# Patient Record
Sex: MALE | Race: WHITE | NOT HISPANIC OR LATINO | Employment: OTHER | URBAN - NONMETROPOLITAN AREA
[De-identification: names, ages, dates, MRNs, and addresses within clinical notes are randomized per-mention and may not be internally consistent; named-entity substitution may affect disease eponyms.]

---

## 2023-12-27 PROBLEM — I21.4 NSTEMI (NON-ST ELEVATED MYOCARDIAL INFARCTION) (MULTI): Status: ACTIVE | Noted: 2023-12-27

## 2023-12-27 PROBLEM — I25.2 HISTORY OF MI (MYOCARDIAL INFARCTION): Status: ACTIVE | Noted: 2023-12-27

## 2023-12-27 PROBLEM — E78.5 HYPERLIPIDEMIA: Status: ACTIVE | Noted: 2023-12-27

## 2023-12-27 PROBLEM — Z98.61 S/P PTCA (PERCUTANEOUS TRANSLUMINAL CORONARY ANGIOPLASTY): Status: ACTIVE | Noted: 2023-12-27

## 2023-12-27 PROBLEM — I25.10 ATHEROSCLEROSIS OF NATIVE CORONARY ARTERY WITHOUT ANGINA PECTORIS: Status: ACTIVE | Noted: 2023-12-27

## 2023-12-27 RX ORDER — HYDROCODONE BITARTRATE AND IBUPROFEN 7.5; 2 MG/1; MG/1
1 TABLET, FILM COATED ORAL EVERY 4 HOURS PRN
COMMUNITY

## 2023-12-27 RX ORDER — PREGABALIN 50 MG/1
50 CAPSULE ORAL 2 TIMES DAILY
COMMUNITY
End: 2024-01-05 | Stop reason: ALTCHOICE

## 2023-12-27 RX ORDER — ASPIRIN 81 MG/1
81 TABLET ORAL DAILY
COMMUNITY

## 2023-12-27 RX ORDER — CARVEDILOL 12.5 MG/1
12.5 TABLET ORAL
COMMUNITY

## 2023-12-27 RX ORDER — EPINEPHRINE 0.22MG
2 AEROSOL WITH ADAPTER (ML) INHALATION DAILY
COMMUNITY

## 2023-12-27 RX ORDER — ATORVASTATIN CALCIUM 40 MG/1
40 TABLET, FILM COATED ORAL EVERY OTHER DAY
COMMUNITY

## 2023-12-27 RX ORDER — FERROUS SULFATE 325(65) MG
325 TABLET ORAL
COMMUNITY

## 2023-12-27 RX ORDER — LOSARTAN POTASSIUM 100 MG/1
100 TABLET ORAL DAILY
COMMUNITY

## 2023-12-27 RX ORDER — NITROGLYCERIN 0.4 MG/1
0.4 TABLET SUBLINGUAL EVERY 5 MIN PRN
COMMUNITY

## 2024-01-05 ENCOUNTER — OFFICE VISIT (OUTPATIENT)
Dept: CARDIOLOGY | Facility: CLINIC | Age: 74
End: 2024-01-05
Payer: MEDICARE

## 2024-01-05 VITALS
SYSTOLIC BLOOD PRESSURE: 136 MMHG | WEIGHT: 177 LBS | BODY MASS INDEX: 26.83 KG/M2 | HEIGHT: 68 IN | DIASTOLIC BLOOD PRESSURE: 70 MMHG | HEART RATE: 52 BPM

## 2024-01-05 DIAGNOSIS — E78.2 MIXED HYPERLIPIDEMIA: ICD-10-CM

## 2024-01-05 DIAGNOSIS — E66.3 OVERWEIGHT (BMI 25.0-29.9): ICD-10-CM

## 2024-01-05 DIAGNOSIS — I25.10 ATHEROSCLEROSIS OF NATIVE CORONARY ARTERY OF NATIVE HEART WITHOUT ANGINA PECTORIS: ICD-10-CM

## 2024-01-05 DIAGNOSIS — I21.4 NSTEMI (NON-ST ELEVATED MYOCARDIAL INFARCTION) (MULTI): ICD-10-CM

## 2024-01-05 DIAGNOSIS — Z98.61 S/P PTCA (PERCUTANEOUS TRANSLUMINAL CORONARY ANGIOPLASTY): ICD-10-CM

## 2024-01-05 PROBLEM — I25.2 HISTORY OF MI (MYOCARDIAL INFARCTION): Status: RESOLVED | Noted: 2023-12-27 | Resolved: 2024-01-05

## 2024-01-05 PROCEDURE — 1159F MED LIST DOCD IN RCRD: CPT | Performed by: INTERNAL MEDICINE

## 2024-01-05 PROCEDURE — 1160F RVW MEDS BY RX/DR IN RCRD: CPT | Performed by: INTERNAL MEDICINE

## 2024-01-05 PROCEDURE — 99213 OFFICE O/P EST LOW 20 MIN: CPT | Performed by: INTERNAL MEDICINE

## 2024-01-05 NOTE — LETTER
"January 5, 2024     Derrick Lozano,   [address] [place] [address]    Patient: Phillip Nguyễn   YOB: 1950   Date of Visit: 1/5/2024       Dear Dr. Derrick Lozano, DO:    Thank you for referring Phillip Nguyễn to me for evaluation. Below are my notes for this consultation.  If you have questions, please do not hesitate to call me. I look forward to following your patient along with you.       Sincerely,     Reji Lozoya,       CC: No Recipients  ______________________________________________________________________________________    Subjective   Phillip Nguyễn is a 73 y.o. male       Chief Complaint    6 Month Post-Transplant Eval          73-year-old gentleman here for 6-month follow-up he is doing well he denies any cardiovascular events, complaints, nitrate usage, hospitalizations.  His NYHA classification is class I    He has a history of non-ST elevation MI in June 2023 with primary revascularization of the distal circumflex and distal RCA with Dr. Alban Trevizo.  He has done well over the past 7 months and remains compliant on current DAPT and GDMT    Recommendations, follow-up in 6 months at which point we will consider discontinuing his ticagrelor, obtain results from laboratories next month.         Review of Systems   All other systems reviewed and are negative.         Visit Vitals  /70 (BP Location: Left arm, Patient Position: Sitting)   Pulse 52   Ht 1.727 m (5' 8\")   Wt 80.3 kg (177 lb)   BMI 26.91 kg/m²   Smoking Status Former   BSA 1.96 m²        Objective   Physical Exam  Constitutional:       Appearance: Normal appearance. He is normal weight.   HENT:      Nose: Nose normal.   Neck:      Vascular: No carotid bruit.   Cardiovascular:      Rate and Rhythm: Normal rate.      Pulses: Normal pulses.      Heart sounds: Normal heart sounds.   Pulmonary:      Effort: Pulmonary effort is normal.   Abdominal:      General: Bowel sounds are normal.      " Palpations: Abdomen is soft.   Genitourinary:     Rectum: Normal.   Musculoskeletal:         General: Normal range of motion.      Cervical back: Normal range of motion.      Right lower leg: No edema.      Left lower leg: No edema.   Skin:     General: Skin is warm and dry.   Neurological:      General: No focal deficit present.      Mental Status: He is alert.   Psychiatric:         Mood and Affect: Mood normal.         Behavior: Behavior normal.         Thought Content: Thought content normal.         Judgment: Judgment normal.         Current Medications    Current Outpatient Medications:   •  aspirin 81 mg EC tablet, Take 1 tablet (81 mg) by mouth once daily., Disp: , Rfl:   •  atorvastatin (Lipitor) 40 mg tablet, Take 1 tablet (40 mg) by mouth every other day., Disp: , Rfl:   •  carvedilol (Coreg) 12.5 mg tablet, Take 1 tablet (12.5 mg) by mouth 2 times a day with meals., Disp: , Rfl:   •  coenzyme Q-10 100 mg capsule, Take 2 capsules (200 mg) by mouth once daily., Disp: , Rfl:   •  ferrous sulfate, 325 mg ferrous sulfate, tablet, Take 1 tablet by mouth. Take one tablet by mouth every Monday and Wednesday, Disp: , Rfl:   •  HYDROcodone-ibuprofen (Vicoprofen) 7.5-200 mg tablet, Take 1 tablet by mouth every 4 hours if needed for severe pain (7 - 10)., Disp: , Rfl:   •  losartan (Cozaar) 100 mg tablet, Take 1 tablet (100 mg) by mouth once daily., Disp: , Rfl:   •  nitroglycerin (Nitrostat) 0.4 mg SL tablet, Place 1 tablet (0.4 mg) under the tongue every 5 minutes if needed for chest pain., Disp: , Rfl:   •  ticagrelor (Brilinta) 90 mg tablet, Take 1 tablet (90 mg) by mouth 2 times a day., Disp: , Rfl:            Scribe Attestation  By signing my name below, April ERNANDEZ LPN  , Scribe   attest that this documentation has been prepared under the direction and in the presence of Reji Lozoya DO.           Assessment/Plan   1. Atherosclerosis of native coronary artery of native heart without angina pectoris         2. NSTEMI (non-ST elevated myocardial infarction) (CMS/HCC)        3. S/P PTCA (percutaneous transluminal coronary angioplasty)        4. Mixed hyperlipidemia        5. Overweight (BMI 25.0-29.9)

## 2024-01-05 NOTE — PROGRESS NOTES
"Subjective   Phillip Nguyễn is a 73 y.o. male       Chief Complaint    6 Month Post-Transplant Eval          73-year-old gentleman here for 6-month follow-up he is doing well he denies any cardiovascular events, complaints, nitrate usage, hospitalizations.  His NYHA classification is class I    He has a history of non-ST elevation MI in June 2023 with primary revascularization of the distal circumflex and distal RCA with Dr. Alban Trevizo.  He has done well over the past 7 months and remains compliant on current DAPT and GDMT    Recommendations, follow-up in 6 months at which point we will consider discontinuing his ticagrelor, obtain results from laboratories next month.         Review of Systems   All other systems reviewed and are negative.         Visit Vitals  /70 (BP Location: Left arm, Patient Position: Sitting)   Pulse 52   Ht 1.727 m (5' 8\")   Wt 80.3 kg (177 lb)   BMI 26.91 kg/m²   Smoking Status Former   BSA 1.96 m²        Objective   Physical Exam  Constitutional:       Appearance: Normal appearance. He is normal weight.   HENT:      Nose: Nose normal.   Neck:      Vascular: No carotid bruit.   Cardiovascular:      Rate and Rhythm: Normal rate.      Pulses: Normal pulses.      Heart sounds: Normal heart sounds.   Pulmonary:      Effort: Pulmonary effort is normal.   Abdominal:      General: Bowel sounds are normal.      Palpations: Abdomen is soft.   Genitourinary:     Rectum: Normal.   Musculoskeletal:         General: Normal range of motion.      Cervical back: Normal range of motion.      Right lower leg: No edema.      Left lower leg: No edema.   Skin:     General: Skin is warm and dry.   Neurological:      General: No focal deficit present.      Mental Status: He is alert.   Psychiatric:         Mood and Affect: Mood normal.         Behavior: Behavior normal.         Thought Content: Thought content normal.         Judgment: Judgment normal.         Current Medications    Current Outpatient " Medications:     aspirin 81 mg EC tablet, Take 1 tablet (81 mg) by mouth once daily., Disp: , Rfl:     atorvastatin (Lipitor) 40 mg tablet, Take 1 tablet (40 mg) by mouth every other day., Disp: , Rfl:     carvedilol (Coreg) 12.5 mg tablet, Take 1 tablet (12.5 mg) by mouth 2 times a day with meals., Disp: , Rfl:     coenzyme Q-10 100 mg capsule, Take 2 capsules (200 mg) by mouth once daily., Disp: , Rfl:     ferrous sulfate, 325 mg ferrous sulfate, tablet, Take 1 tablet by mouth. Take one tablet by mouth every Monday and Wednesday, Disp: , Rfl:     HYDROcodone-ibuprofen (Vicoprofen) 7.5-200 mg tablet, Take 1 tablet by mouth every 4 hours if needed for severe pain (7 - 10)., Disp: , Rfl:     losartan (Cozaar) 100 mg tablet, Take 1 tablet (100 mg) by mouth once daily., Disp: , Rfl:     nitroglycerin (Nitrostat) 0.4 mg SL tablet, Place 1 tablet (0.4 mg) under the tongue every 5 minutes if needed for chest pain., Disp: , Rfl:     ticagrelor (Brilinta) 90 mg tablet, Take 1 tablet (90 mg) by mouth 2 times a day., Disp: , Rfl:            Scribe Attestation  By signing my name below, OMA Aprilenrique EASTON LPN  , Scribe   attest that this documentation has been prepared under the direction and in the presence of Reji Lozoya DO.           Assessment/Plan   1. Atherosclerosis of native coronary artery of native heart without angina pectoris        2. NSTEMI (non-ST elevated myocardial infarction) (CMS/[facility])        3. S/P PTCA (percutaneous transluminal coronary angioplasty)        4. Mixed hyperlipidemia        5. Overweight (BMI 25.0-29.9)

## 2024-05-15 DIAGNOSIS — I25.10 ATHEROSCLEROSIS OF NATIVE CORONARY ARTERY WITHOUT ANGINA PECTORIS, UNSPECIFIED WHETHER NATIVE OR TRANSPLANTED HEART: ICD-10-CM

## 2024-05-15 NOTE — TELEPHONE ENCOUNTER
Patient phoned states he can no longer afford Brilinta, doesn't qualify for patient assistance. Inquiring if there is an alternative you would recommend, or if he could discontinue use. Please advise.    To Dr. Reji Liang MD for review

## 2024-05-21 RX ORDER — CLOPIDOGREL BISULFATE 75 MG/1
75 TABLET ORAL DAILY
Qty: 90 TABLET | Refills: 3 | Status: SHIPPED | OUTPATIENT
Start: 2024-05-21 | End: 2025-05-21

## 2024-05-29 RX ORDER — TICAGRELOR 90 MG/1
90 TABLET ORAL 2 TIMES DAILY
Qty: 180 TABLET | Refills: 3 | OUTPATIENT
Start: 2024-05-29

## 2024-06-18 DIAGNOSIS — E78.5 HYPERLIPIDEMIA, UNSPECIFIED HYPERLIPIDEMIA TYPE: ICD-10-CM

## 2024-06-20 RX ORDER — ATORVASTATIN CALCIUM 40 MG/1
40 TABLET, FILM COATED ORAL
Qty: 45 TABLET | Refills: 3 | OUTPATIENT
Start: 2024-06-20

## 2024-06-24 DIAGNOSIS — E78.2 MIXED HYPERLIPIDEMIA: ICD-10-CM

## 2024-06-27 RX ORDER — ATORVASTATIN CALCIUM 40 MG/1
40 TABLET, FILM COATED ORAL EVERY OTHER DAY
Qty: 45 TABLET | Refills: 3 | Status: SHIPPED | OUTPATIENT
Start: 2024-06-27 | End: 2025-06-27

## 2024-07-09 ENCOUNTER — APPOINTMENT (OUTPATIENT)
Dept: CARDIOLOGY | Facility: CLINIC | Age: 74
End: 2024-07-09
Payer: MEDICARE

## 2024-07-09 VITALS
BODY MASS INDEX: 26.07 KG/M2 | WEIGHT: 176 LBS | DIASTOLIC BLOOD PRESSURE: 80 MMHG | SYSTOLIC BLOOD PRESSURE: 138 MMHG | HEIGHT: 69 IN | HEART RATE: 52 BPM

## 2024-07-09 DIAGNOSIS — Z98.61 S/P PTCA (PERCUTANEOUS TRANSLUMINAL CORONARY ANGIOPLASTY): ICD-10-CM

## 2024-07-09 DIAGNOSIS — Z87.891 FORMER SMOKER: ICD-10-CM

## 2024-07-09 DIAGNOSIS — I10 ESSENTIAL HYPERTENSION: ICD-10-CM

## 2024-07-09 DIAGNOSIS — E66.3 OVERWEIGHT (BMI 25.0-29.9): ICD-10-CM

## 2024-07-09 DIAGNOSIS — I25.10 ATHEROSCLEROSIS OF NATIVE CORONARY ARTERY WITHOUT ANGINA PECTORIS, UNSPECIFIED WHETHER NATIVE OR TRANSPLANTED HEART: ICD-10-CM

## 2024-07-09 DIAGNOSIS — I25.10 ATHEROSCLEROSIS OF NATIVE CORONARY ARTERY OF NATIVE HEART WITHOUT ANGINA PECTORIS: ICD-10-CM

## 2024-07-09 DIAGNOSIS — I21.4 NSTEMI (NON-ST ELEVATED MYOCARDIAL INFARCTION) (MULTI): ICD-10-CM

## 2024-07-09 DIAGNOSIS — E78.2 MIXED HYPERLIPIDEMIA: ICD-10-CM

## 2024-07-09 PROCEDURE — 3079F DIAST BP 80-89 MM HG: CPT | Performed by: INTERNAL MEDICINE

## 2024-07-09 PROCEDURE — 1036F TOBACCO NON-USER: CPT | Performed by: INTERNAL MEDICINE

## 2024-07-09 PROCEDURE — 3075F SYST BP GE 130 - 139MM HG: CPT | Performed by: INTERNAL MEDICINE

## 2024-07-09 PROCEDURE — 1159F MED LIST DOCD IN RCRD: CPT | Performed by: INTERNAL MEDICINE

## 2024-07-09 PROCEDURE — 99214 OFFICE O/P EST MOD 30 MIN: CPT | Performed by: INTERNAL MEDICINE

## 2024-07-09 PROCEDURE — 1160F RVW MEDS BY RX/DR IN RCRD: CPT | Performed by: INTERNAL MEDICINE

## 2024-07-09 RX ORDER — CARVEDILOL 6.25 MG/1
6.25 TABLET ORAL
Qty: 180 TABLET | Refills: 3 | Status: SHIPPED | OUTPATIENT
Start: 2024-07-09 | End: 2025-07-09

## 2024-07-09 RX ORDER — CLOPIDOGREL BISULFATE 75 MG/1
75 TABLET ORAL DAILY
Qty: 90 TABLET | Refills: 1 | Status: SHIPPED | OUTPATIENT
Start: 2024-07-09 | End: 2025-07-09

## 2024-07-09 NOTE — PATIENT INSTRUCTIONS
Please bring all medicines, vitamins, and herbal supplements with you when you come to the office.    Prescriptions will not be filled unless you are compliant with your follow up appointments or have a follow up appointment scheduled as per instruction of your physician. Refills should be requested at the time of your visit.     Continue plavix for additional 6 months. May discontinue in February 2025    BMI was above normal measurement. Current weight: 79.8 kg (176 lb)  Weight change since last visit (-) denotes wt loss -1 lbs   Weight loss needed to achieve BMI 25: 9.5 Lbs  Weight loss needed to achieve BMI 30: -23.8 Lbs  Provided instructions on dietary changes.

## 2024-07-09 NOTE — LETTER
"July 9, 2024     Derrick Lozano,   [address] [place] [address]    Patient: phillip Nguyễn   YOB: 1950   Date of Visit: 7/9/2024       Dear Dr. Derrick Lozano, DO:    Thank you for referring phillip Nguyễn to me for evaluation. Below are my notes for this consultation.  If you have questions, please do not hesitate to call me. I look forward to following your patient along with you.       Sincerely,     Reji Lozoya,       CC: No Recipients  ______________________________________________________________________________________    Subjective   Phillip Nguyễn is a 74 y.o. male       Chief Complaint    Follow-up          74-year-old gentleman returns for follow-up he is doing well he has no cardiovascular events, complaints, nitrate usage or hospitalizations.  He does complain of chronic fatigue likely secondary to beta-blocker side effect.    He did not bring any of his medications and therefore cannot ascribe to his current meds or dosings.    She supposedly remains on DAPT with clopidogrel and aspirin, and carvedilol at 12.5 mg twice daily in addition to losartan and atorvastatin.    He is a former smoker, has essential hypertension hyperlipidemia, non-ST elevation MI in June 2023 with revascularization of the distal circumflex and distal right with Dr. Alban Trevizo at that time.  He has done well since that time.    Recommendations: Continue current therapies other than we will titrate his carvedilol down to 6.25 twice daily, take it with meals; continue with clopidogrel for another 6 months for total of 18 months from his revascularization procedures and then discontinue in February, will follow-up again in 1 year         Review of Systems   All other systems reviewed and are negative.           Vitals:    07/09/24 0943   BP: 138/80   BP Location: Left arm   Patient Position: Sitting   Pulse: 52   Weight: 79.8 kg (176 lb)   Height: 1.74 m (5' 8.5\")        Objective "   Physical Exam  Constitutional:       Appearance: Normal appearance.   HENT:      Nose: Nose normal.   Neck:      Vascular: No carotid bruit.   Cardiovascular:      Rate and Rhythm: Normal rate.      Pulses: Normal pulses.      Heart sounds: Normal heart sounds.   Pulmonary:      Effort: Pulmonary effort is normal.   Abdominal:      General: Bowel sounds are normal.      Palpations: Abdomen is soft.   Musculoskeletal:         General: Normal range of motion.      Cervical back: Normal range of motion.      Right lower leg: No edema.      Left lower leg: No edema.   Skin:     General: Skin is warm and dry.   Neurological:      General: No focal deficit present.      Mental Status: He is alert.   Psychiatric:         Mood and Affect: Mood normal.         Behavior: Behavior normal.         Thought Content: Thought content normal.         Judgment: Judgment normal.         Allergies  Patient has no known allergies.     Current Medications    Current Outpatient Medications:   •  aspirin 81 mg EC tablet, Take 1 tablet (81 mg) by mouth once daily., Disp: , Rfl:   •  atorvastatin (Lipitor) 40 mg tablet, Take 1 tablet (40 mg) by mouth every other day., Disp: 45 tablet, Rfl: 3  •  carvedilol (Coreg) 12.5 mg tablet, Take 1 tablet (12.5 mg) by mouth 2 times daily (morning and late afternoon)., Disp: , Rfl:   •  clopidogrel (Plavix) 75 mg tablet, Take 1 tablet (75 mg) by mouth once daily., Disp: 90 tablet, Rfl: 3  •  coenzyme Q-10 100 mg capsule, Take 2 capsules (200 mg) by mouth once daily., Disp: , Rfl:   •  ferrous sulfate, 325 mg ferrous sulfate, tablet, Take 1 tablet by mouth. Take one tablet by mouth every Monday and Wednesday, Disp: , Rfl:   •  HYDROcodone-ibuprofen (Vicoprofen) 7.5-200 mg tablet, Take 1 tablet by mouth every 4 hours if needed for severe pain (7 - 10)., Disp: , Rfl:   •  losartan (Cozaar) 100 mg tablet, Take 1 tablet (100 mg) by mouth once daily., Disp: , Rfl:   •  nitroglycerin (Nitrostat) 0.4 mg SL  tablet, Place 1 tablet (0.4 mg) under the tongue every 5 minutes if needed for chest pain., Disp: , Rfl:                      Assessment/Plan   1. Atherosclerosis of native coronary artery of native heart without angina pectoris  Follow Up In Cardiology      2. S/P PTCA (percutaneous transluminal coronary angioplasty)        3. NSTEMI (non-ST elevated myocardial infarction) (Multi)        4. Essential hypertension        5. Mixed hyperlipidemia        6. Overweight (BMI 25.0-29.9)        7. Former smoker                 Scribe Attestation  By signing my name below, I, April EASTON LPN  , Scribe   attest that this documentation has been prepared under the direction and in the presence of Reji Lozoya DO.     Provider Attestation - Scribe documentation    All medical record entries made by the Scribe were at my direction and personally dictated by me. I have reviewed the chart and agree that the record accurately reflects my personal performance of the history, physical exam, discussion and plan.

## 2024-07-09 NOTE — PROGRESS NOTES
"Subjective   Phillip Nguyễn is a 74 y.o. male       Chief Complaint    Follow-up          74-year-old gentleman returns for follow-up he is doing well he has no cardiovascular events, complaints, nitrate usage or hospitalizations.  He does complain of chronic fatigue likely secondary to beta-blocker side effect.    He did not bring any of his medications and therefore cannot ascribe to his current meds or dosings.    She supposedly remains on DAPT with clopidogrel and aspirin, and carvedilol at 12.5 mg twice daily in addition to losartan and atorvastatin.    He is a former smoker, has essential hypertension hyperlipidemia, non-ST elevation MI in June 2023 with revascularization of the distal circumflex and distal right with Dr. Alban Trevizo at that time.  He has done well since that time.    Recommendations: Continue current therapies other than we will titrate his carvedilol down to 6.25 twice daily, take it with meals; continue with clopidogrel for another 6 months for total of 18 months from his revascularization procedures and then discontinue in February, will follow-up again in 1 year         Review of Systems   All other systems reviewed and are negative.           Vitals:    07/09/24 0943   BP: 138/80   BP Location: Left arm   Patient Position: Sitting   Pulse: 52   Weight: 79.8 kg (176 lb)   Height: 1.74 m (5' 8.5\")        Objective   Physical Exam  Constitutional:       Appearance: Normal appearance.   HENT:      Nose: Nose normal.   Neck:      Vascular: No carotid bruit.   Cardiovascular:      Rate and Rhythm: Normal rate.      Pulses: Normal pulses.      Heart sounds: Normal heart sounds.   Pulmonary:      Effort: Pulmonary effort is normal.   Abdominal:      General: Bowel sounds are normal.      Palpations: Abdomen is soft.   Musculoskeletal:         General: Normal range of motion.      Cervical back: Normal range of motion.      Right lower leg: No edema.      Left lower leg: No edema.   Skin:     " General: Skin is warm and dry.   Neurological:      General: No focal deficit present.      Mental Status: He is alert.   Psychiatric:         Mood and Affect: Mood normal.         Behavior: Behavior normal.         Thought Content: Thought content normal.         Judgment: Judgment normal.         Allergies  Patient has no known allergies.     Current Medications    Current Outpatient Medications:     aspirin 81 mg EC tablet, Take 1 tablet (81 mg) by mouth once daily., Disp: , Rfl:     atorvastatin (Lipitor) 40 mg tablet, Take 1 tablet (40 mg) by mouth every other day., Disp: 45 tablet, Rfl: 3    carvedilol (Coreg) 12.5 mg tablet, Take 1 tablet (12.5 mg) by mouth 2 times daily (morning and late afternoon)., Disp: , Rfl:     clopidogrel (Plavix) 75 mg tablet, Take 1 tablet (75 mg) by mouth once daily., Disp: 90 tablet, Rfl: 3    coenzyme Q-10 100 mg capsule, Take 2 capsules (200 mg) by mouth once daily., Disp: , Rfl:     ferrous sulfate, 325 mg ferrous sulfate, tablet, Take 1 tablet by mouth. Take one tablet by mouth every Monday and Wednesday, Disp: , Rfl:     HYDROcodone-ibuprofen (Vicoprofen) 7.5-200 mg tablet, Take 1 tablet by mouth every 4 hours if needed for severe pain (7 - 10)., Disp: , Rfl:     losartan (Cozaar) 100 mg tablet, Take 1 tablet (100 mg) by mouth once daily., Disp: , Rfl:     nitroglycerin (Nitrostat) 0.4 mg SL tablet, Place 1 tablet (0.4 mg) under the tongue every 5 minutes if needed for chest pain., Disp: , Rfl:                      Assessment/Plan   1. Atherosclerosis of native coronary artery of native heart without angina pectoris  Follow Up In Cardiology      2. S/P PTCA (percutaneous transluminal coronary angioplasty)        3. NSTEMI (non-ST elevated myocardial infarction) (Multi)        4. Essential hypertension        5. Mixed hyperlipidemia        6. Overweight (BMI 25.0-29.9)        7. Former smoker                 Scribe Attestation  By signing my name below, April ERNANDEZ LPN  ,  Scribe   attest that this documentation has been prepared under the direction and in the presence of Reji Lozoya DO.     Provider Attestation - Scribe documentation    All medical record entries made by the Scribe were at my direction and personally dictated by me. I have reviewed the chart and agree that the record accurately reflects my personal performance of the history, physical exam, discussion and plan.

## 2024-07-11 DIAGNOSIS — Z98.61 CORONARY ANGIOPLASTY STATUS: ICD-10-CM

## 2024-07-11 RX ORDER — CARVEDILOL 12.5 MG/1
12.5 TABLET ORAL
Qty: 180 TABLET | Refills: 3 | Status: SHIPPED | OUTPATIENT
Start: 2024-07-11

## 2024-07-26 ENCOUNTER — TELEPHONE (OUTPATIENT)
Dept: CARDIOLOGY | Facility: CLINIC | Age: 74
End: 2024-07-26
Payer: MEDICARE

## 2024-07-26 NOTE — TELEPHONE ENCOUNTER
Since off of Brilinta and on Plavix  ,Patient wants to know when he can donate blood.     Please advise

## 2024-11-14 ENCOUNTER — TELEPHONE (OUTPATIENT)
Dept: CARDIOLOGY | Facility: CLINIC | Age: 74
End: 2024-11-14
Payer: MEDICARE

## 2024-11-14 NOTE — TELEPHONE ENCOUNTER
Vera at [facility] Dr. Harvey left vm via the nurse line requesting Poc and ok to hold plavix 5 days prior to right shoulder athroscopy and rotator cuff repair 12/18/2024.     Fax [phone]

## 2025-02-19 ENCOUNTER — TELEPHONE (OUTPATIENT)
Dept: CARDIOLOGY | Facility: CLINIC | Age: 75
End: 2025-02-19
Payer: MEDICARE

## 2025-02-19 NOTE — TELEPHONE ENCOUNTER
Voicemail from patient asking if he to still stop Plavix this month, as it was discussed at his 07.09.25 appointment.  Pt is almost out, and wants to confirm he is not to refill.

## 2025-02-20 NOTE — TELEPHONE ENCOUNTER
Spoke with patient, he states there have not been any new concerns that he has developed. States he has 3 days left and will then stop it.

## 2025-06-28 DIAGNOSIS — E78.2 MIXED HYPERLIPIDEMIA: ICD-10-CM

## 2025-07-01 RX ORDER — ATORVASTATIN CALCIUM 40 MG/1
40 TABLET, FILM COATED ORAL EVERY OTHER DAY
Qty: 45 TABLET | Refills: 3 | Status: SHIPPED | OUTPATIENT
Start: 2025-07-01

## 2025-07-09 ENCOUNTER — APPOINTMENT (OUTPATIENT)
Dept: CARDIOLOGY | Facility: CLINIC | Age: 75
End: 2025-07-09
Payer: MEDICARE

## 2025-07-09 VITALS
SYSTOLIC BLOOD PRESSURE: 112 MMHG | HEART RATE: 68 BPM | BODY MASS INDEX: 26.07 KG/M2 | WEIGHT: 176 LBS | HEIGHT: 69 IN | DIASTOLIC BLOOD PRESSURE: 64 MMHG

## 2025-07-09 DIAGNOSIS — I21.4 NSTEMI (NON-ST ELEVATED MYOCARDIAL INFARCTION) (MULTI): ICD-10-CM

## 2025-07-09 DIAGNOSIS — I25.10 ATHEROSCLEROSIS OF NATIVE CORONARY ARTERY OF NATIVE HEART WITHOUT ANGINA PECTORIS: ICD-10-CM

## 2025-07-09 DIAGNOSIS — Z87.891 FORMER SMOKER: ICD-10-CM

## 2025-07-09 DIAGNOSIS — E78.2 MIXED HYPERLIPIDEMIA: ICD-10-CM

## 2025-07-09 DIAGNOSIS — Z98.61 S/P PTCA (PERCUTANEOUS TRANSLUMINAL CORONARY ANGIOPLASTY): ICD-10-CM

## 2025-07-09 DIAGNOSIS — I10 ESSENTIAL HYPERTENSION: ICD-10-CM

## 2025-07-09 PROCEDURE — 1160F RVW MEDS BY RX/DR IN RCRD: CPT | Performed by: INTERNAL MEDICINE

## 2025-07-09 PROCEDURE — 99213 OFFICE O/P EST LOW 20 MIN: CPT | Performed by: INTERNAL MEDICINE

## 2025-07-09 PROCEDURE — 1159F MED LIST DOCD IN RCRD: CPT | Performed by: INTERNAL MEDICINE

## 2025-07-09 PROCEDURE — 3008F BODY MASS INDEX DOCD: CPT | Performed by: INTERNAL MEDICINE

## 2025-07-09 PROCEDURE — 3078F DIAST BP <80 MM HG: CPT | Performed by: INTERNAL MEDICINE

## 2025-07-09 PROCEDURE — 1036F TOBACCO NON-USER: CPT | Performed by: INTERNAL MEDICINE

## 2025-07-09 PROCEDURE — 3074F SYST BP LT 130 MM HG: CPT | Performed by: INTERNAL MEDICINE

## 2025-07-09 RX ORDER — ATORVASTATIN CALCIUM 40 MG/1
40 TABLET, FILM COATED ORAL EVERY OTHER DAY
Qty: 45 TABLET | Refills: 3 | Status: SHIPPED | OUTPATIENT
Start: 2025-07-09 | End: 2026-07-09

## 2025-07-09 NOTE — PATIENT INSTRUCTIONS
Please bring all medicines, vitamins, and herbal supplements with you when you come to the office.    Prescriptions will not be filled unless you are compliant with your follow up appointments or have a follow up appointment scheduled as per instruction of your physician. Refills should be requested at the time of your visit.     BMI was above normal measurement. Current weight: 79.8 kg (176 lb)  Weight change since last visit (-) denotes wt loss 0 lbs   Weight loss needed to achieve BMI 25: 9.5 Lbs  Weight loss needed to achieve BMI 30: -23.8 Lbs  Provided instructions on dietary changes  Provided instructions on exercise.

## 2025-07-09 NOTE — LETTER
"July 9, 2025     Derrick Lozano, DO  [address] [place] [address]    Patient: phillip Nguyễn   YOB: 1950   Date of Visit: 7/9/2025       Dear Dr. Derrick Lozano, DO:    Thank you for referring phillip Nguyễn to me for evaluation. Below are my notes for this consultation.  If you have questions, please do not hesitate to call me. I look forward to following your patient along with you.       Sincerely,     Reji Lozoya DO      CC: No Recipients  ______________________________________________________________________________________    Chief Complaint   Patient presents with   • Follow-up     1 year Follow up for Coronary Artery Disease        Subjective   Phillip Nguyễn is a 75 y.o. male     75-year-old gentleman returns for annual cardiovascular follow-up for continued cardiovascular surveillance.  He is doing well he denies any cardiovascular events, complaints, nitrate usage or hospitalizations.    He is a former smoker, has essential hypertension hyperlipidemia, non-ST elevation MI in June 2023 with revascularization of the distal circumflex and distal right with Dr. Alban Trevizo at that time.    Most recent labs are reviewed, A1c is 5.2% LDL is 83 remainder of his laboratories are within normal limits as reviewed.  He remains on appropriate GDMT; stopped clopidogrel last year.    Recommendations, follow-up in 1 year continue healthy lifestyle.         Review of Systems   All other systems reviewed and are negative.           Vitals:    07/09/25 0907   BP: 112/64   BP Location: Right arm   Patient Position: Sitting   Pulse: 68   Weight: 79.8 kg (176 lb)   Height: 1.74 m (5' 8.5\")        Objective   Physical Exam  Constitutional:       Appearance: Normal appearance.   HENT:      Nose: Nose normal.   Neck:      Vascular: No carotid bruit.   Cardiovascular:      Rate and Rhythm: Normal rate.      Pulses: Normal pulses.      Heart sounds: Normal heart sounds.   Pulmonary:      Effort: " Pulmonary effort is normal.   Abdominal:      General: Bowel sounds are normal.      Palpations: Abdomen is soft.   Musculoskeletal:         General: Normal range of motion.      Cervical back: Normal range of motion.      Right lower leg: No edema.      Left lower leg: No edema.   Skin:     General: Skin is warm and dry.   Neurological:      General: No focal deficit present.      Mental Status: He is alert.   Psychiatric:         Mood and Affect: Mood normal.         Behavior: Behavior normal.         Thought Content: Thought content normal.         Judgment: Judgment normal.         Allergies  Patient has no known allergies.     Current Medications  Current Outpatient Medications   Medication Instructions   • aspirin 81 mg, Daily   • atorvastatin (LIPITOR) 40 mg, oral, Every other day   • carvedilol (COREG) 6.25 mg, oral, 2 times daily (morning and late afternoon)   • coenzyme Q-10 100 mg capsule 2 capsules, Daily   • ferrous sulfate 325 mg   • HYDROcodone-ibuprofen (Vicoprofen) 7.5-200 mg tablet 1 tablet, Every 4 hours PRN   • losartan (COZAAR) 100 mg, Daily   • nitroglycerin (NITROSTAT) 0.4 mg, Every 5 min PRN                        Assessment/Plan   1. Atherosclerosis of native coronary artery of native heart without angina pectoris  Follow Up In Cardiology      2. NSTEMI (non-ST elevated myocardial infarction) (Multi)        3. Mixed hyperlipidemia        4. Essential hypertension        5. S/P PTCA (percutaneous transluminal coronary angioplasty)        6. Former smoker        7. BMI 26.0-26.9,adult                 Scribe Attestation  By signing my name below, IJo Ann RN   , Scribe   attest that this documentation has been prepared under the direction and in the presence of Reji Lozoya DO.     Provider Attestation - Scribe documentation    All medical record entries made by the Scribe were at my direction and personally dictated by me. I have reviewed the chart and agree that the record  accurately reflects my personal performance of the history, physical exam, discussion and plan.

## 2025-07-09 NOTE — PROGRESS NOTES
"Chief Complaint   Patient presents with    Follow-up     1 year Follow up for Coronary Artery Disease        Subjective   Phillip Nguyễn is a 75 y.o. male     75-year-old gentleman returns for annual cardiovascular follow-up for continued cardiovascular surveillance.  He is doing well he denies any cardiovascular events, complaints, nitrate usage or hospitalizations.    He is a former smoker, has essential hypertension hyperlipidemia, non-ST elevation MI in June 2023 with revascularization of the distal circumflex and distal right with Dr. Alban Trevizo at that time.    Most recent labs are reviewed, A1c is 5.2% LDL is 83 remainder of his laboratories are within normal limits as reviewed.  He remains on appropriate GDMT; stopped clopidogrel last year.    Recommendations, follow-up in 1 year continue healthy lifestyle.         Review of Systems   All other systems reviewed and are negative.           Vitals:    07/09/25 0907   BP: 112/64   BP Location: Right arm   Patient Position: Sitting   Pulse: 68   Weight: 79.8 kg (176 lb)   Height: 1.74 m (5' 8.5\")        Objective   Physical Exam  Constitutional:       Appearance: Normal appearance.   HENT:      Nose: Nose normal.   Neck:      Vascular: No carotid bruit.   Cardiovascular:      Rate and Rhythm: Normal rate.      Pulses: Normal pulses.      Heart sounds: Normal heart sounds.   Pulmonary:      Effort: Pulmonary effort is normal.   Abdominal:      General: Bowel sounds are normal.      Palpations: Abdomen is soft.   Musculoskeletal:         General: Normal range of motion.      Cervical back: Normal range of motion.      Right lower leg: No edema.      Left lower leg: No edema.   Skin:     General: Skin is warm and dry.   Neurological:      General: No focal deficit present.      Mental Status: He is alert.   Psychiatric:         Mood and Affect: Mood normal.         Behavior: Behavior normal.         Thought Content: Thought content normal.         Judgment: " Judgment normal.         Allergies  Patient has no known allergies.     Current Medications  Current Outpatient Medications   Medication Instructions    aspirin 81 mg, Daily    atorvastatin (LIPITOR) 40 mg, oral, Every other day    carvedilol (COREG) 6.25 mg, oral, 2 times daily (morning and late afternoon)    coenzyme Q-10 100 mg capsule 2 capsules, Daily    ferrous sulfate 325 mg    HYDROcodone-ibuprofen (Vicoprofen) 7.5-200 mg tablet 1 tablet, Every 4 hours PRN    losartan (COZAAR) 100 mg, Daily    nitroglycerin (NITROSTAT) 0.4 mg, Every 5 min PRN                        Assessment/Plan   1. Atherosclerosis of native coronary artery of native heart without angina pectoris  Follow Up In Cardiology      2. NSTEMI (non-ST elevated myocardial infarction) (Multi)        3. Mixed hyperlipidemia        4. Essential hypertension        5. S/P PTCA (percutaneous transluminal coronary angioplasty)        6. Former smoker        7. BMI 26.0-26.9,adult                 Scribe Attestation  By signing my name below, IJo Ann RN   , Scribe   attest that this documentation has been prepared under the direction and in the presence of Reji Lozoya DO.     Provider Attestation - Scribe documentation    All medical record entries made by the Scribe were at my direction and personally dictated by me. I have reviewed the chart and agree that the record accurately reflects my personal performance of the history, physical exam, discussion and plan.

## 2025-07-11 DIAGNOSIS — I10 ESSENTIAL HYPERTENSION: ICD-10-CM

## 2025-07-11 DIAGNOSIS — I25.10 ATHEROSCLEROSIS OF NATIVE CORONARY ARTERY OF NATIVE HEART WITHOUT ANGINA PECTORIS: ICD-10-CM

## 2025-07-11 DIAGNOSIS — I21.4 NSTEMI (NON-ST ELEVATED MYOCARDIAL INFARCTION) (MULTI): ICD-10-CM

## 2025-07-11 RX ORDER — CARVEDILOL 6.25 MG/1
6.25 TABLET ORAL 2 TIMES DAILY
Qty: 180 TABLET | Refills: 3 | Status: SHIPPED | OUTPATIENT
Start: 2025-07-11 | End: 2026-07-11

## 2025-07-30 ENCOUNTER — TELEPHONE (OUTPATIENT)
Dept: CARDIOLOGY | Facility: CLINIC | Age: 75
End: 2025-07-30
Payer: MEDICARE

## 2025-07-30 NOTE — TELEPHONE ENCOUNTER
Patient phoned in states Dr. Lozano is requesting the ok from Dr. Reji Lozoya, DO to continue with patient Toradol injections in his back. Patient reports he had two this year already and usually gets them 3-4 times yearly. Patient reports he has been doing this for quite sometime, but now Dr. Lozano is wanting to approve with cardiology, that it won't interfere with any cardiac meds.     Notify patient  Fax to Dr. Lozano [phone]

## 2025-08-01 NOTE — TELEPHONE ENCOUNTER
Patient advised, verbalized understanding. Requested copy be placed at  for pickup.     Faxed to Dr. Lozano

## 2026-07-09 ENCOUNTER — APPOINTMENT (OUTPATIENT)
Dept: CARDIOLOGY | Facility: CLINIC | Age: 76
End: 2026-07-09
Payer: MEDICARE